# Patient Record
Sex: FEMALE | Race: OTHER | HISPANIC OR LATINO
[De-identification: names, ages, dates, MRNs, and addresses within clinical notes are randomized per-mention and may not be internally consistent; named-entity substitution may affect disease eponyms.]

---

## 2020-03-02 ENCOUNTER — TRANSCRIPTION ENCOUNTER (OUTPATIENT)
Age: 25
End: 2020-03-02

## 2020-11-22 ENCOUNTER — FORM ENCOUNTER (OUTPATIENT)
Age: 25
End: 2020-11-22

## 2020-12-29 ENCOUNTER — FORM ENCOUNTER (OUTPATIENT)
Age: 25
End: 2020-12-29

## 2021-02-22 ENCOUNTER — TRANSCRIPTION ENCOUNTER (OUTPATIENT)
Age: 26
End: 2021-02-22

## 2021-02-27 ENCOUNTER — TRANSCRIPTION ENCOUNTER (OUTPATIENT)
Age: 26
End: 2021-02-27

## 2021-03-20 ENCOUNTER — TRANSCRIPTION ENCOUNTER (OUTPATIENT)
Age: 26
End: 2021-03-20

## 2021-06-16 ENCOUNTER — TRANSCRIPTION ENCOUNTER (OUTPATIENT)
Age: 26
End: 2021-06-16

## 2021-09-21 ENCOUNTER — TRANSCRIPTION ENCOUNTER (OUTPATIENT)
Age: 26
End: 2021-09-21

## 2021-11-24 ENCOUNTER — TRANSCRIPTION ENCOUNTER (OUTPATIENT)
Age: 26
End: 2021-11-24

## 2021-12-09 ENCOUNTER — TRANSCRIPTION ENCOUNTER (OUTPATIENT)
Age: 26
End: 2021-12-09

## 2022-01-13 ENCOUNTER — TRANSCRIPTION ENCOUNTER (OUTPATIENT)
Age: 27
End: 2022-01-13

## 2022-05-01 ENCOUNTER — NON-APPOINTMENT (OUTPATIENT)
Age: 27
End: 2022-05-01

## 2022-07-12 ENCOUNTER — NON-APPOINTMENT (OUTPATIENT)
Age: 27
End: 2022-07-12

## 2022-08-16 ENCOUNTER — APPOINTMENT (OUTPATIENT)
Dept: OBGYN | Facility: CLINIC | Age: 27
End: 2022-08-16

## 2022-08-16 VITALS
BODY MASS INDEX: 20.38 KG/M2 | OXYGEN SATURATION: 99 % | SYSTOLIC BLOOD PRESSURE: 120 MMHG | WEIGHT: 115 LBS | DIASTOLIC BLOOD PRESSURE: 72 MMHG | RESPIRATION RATE: 12 BRPM | HEART RATE: 51 BPM | HEIGHT: 63 IN

## 2022-08-16 DIAGNOSIS — Z72.89 OTHER PROBLEMS RELATED TO LIFESTYLE: ICD-10-CM

## 2022-08-16 DIAGNOSIS — Z78.9 OTHER SPECIFIED HEALTH STATUS: ICD-10-CM

## 2022-08-16 DIAGNOSIS — Z82.49 FAMILY HISTORY OF ISCHEMIC HEART DISEASE AND OTHER DISEASES OF THE CIRCULATORY SYSTEM: ICD-10-CM

## 2022-08-16 DIAGNOSIS — N39.0 URINARY TRACT INFECTION, SITE NOT SPECIFIED: ICD-10-CM

## 2022-08-16 PROBLEM — Z00.00 ENCOUNTER FOR PREVENTIVE HEALTH EXAMINATION: Status: ACTIVE | Noted: 2022-08-16

## 2022-08-16 LAB
BILIRUB UR QL STRIP: NORMAL
CLARITY UR: CLEAR
GLUCOSE UR-MCNC: NORMAL
HCG UR QL: 0.2 EU/DL
HCG UR QL: NEGATIVE
HGB UR QL STRIP.AUTO: NORMAL
KETONES UR-MCNC: NORMAL
LEUKOCYTE ESTERASE UR QL STRIP: NORMAL
NITRITE UR QL STRIP: NORMAL
PH UR STRIP: 7
PROT UR STRIP-MCNC: NORMAL
SP GR UR STRIP: 1.01

## 2022-08-16 PROCEDURE — 99213 OFFICE O/P EST LOW 20 MIN: CPT | Mod: 25

## 2022-08-16 PROCEDURE — 81025 URINE PREGNANCY TEST: CPT

## 2022-08-16 PROCEDURE — 81002 URINALYSIS NONAUTO W/O SCOPE: CPT

## 2022-08-16 NOTE — PLAN
[FreeTextEntry1] : Recurrent cystitis discussed and all questions were answered.\par Antibiotics sent to pharmacy.\par Post coital antibiotics recommended and discussed.

## 2022-08-16 NOTE — HISTORY OF PRESENT ILLNESS
[Y] : Patient uses contraception [N] : Patient denies prior pregnancies [Regular Cycle Intervals] : periods have been regular [Menarche Age: ____] : age at menarche was [unfilled] [No] : Patient does not have concerns regarding sex [Currently Active] : currently active [Men] : men [Yes] : Yes [Condoms] : Condoms [TextBox_4] : UTI [TextBox_19] : n/a [TextBox_25] : n/a [PapSmeardate] : 01/19 [TextBox_37] : n/a [TextBox_43] : n/a [TextBox_78] : no HX [LMPDate] : 08/10/22 [MensesFreq] : 30 [MensesLength] : 5 [PGHxTotal] : 0 [TextBox_6] : 08/10/22 [TextBox_9] : 13 [FreeTextEntry1] : 08/10/22

## 2022-08-16 NOTE — PHYSICAL EXAM
[Appropriately responsive] : appropriately responsive [Alert] : alert [No Acute Distress] : no acute distress [No Lymphadenopathy] : no lymphadenopathy [Regular Rate Rhythm] : regular rate rhythm [No Murmurs] : no murmurs [Clear to Auscultation B/L] : clear to auscultation bilaterally [Soft] : soft [Non-tender] : non-tender [Non-distended] : non-distended [No HSM] : No HSM [No Lesions] : no lesions [No Mass] : no mass [Oriented x3] : oriented x3 [Labia Majora] : normal [Labia Minora] : normal [Normal] : normal [Uterine Adnexae] : normal [FreeTextEntry8] : moderate suprpubic tenderness

## 2023-05-02 ENCOUNTER — APPOINTMENT (OUTPATIENT)
Dept: OBGYN | Facility: CLINIC | Age: 28
End: 2023-05-02
Payer: COMMERCIAL

## 2023-05-02 VITALS
RESPIRATION RATE: 16 BRPM | HEIGHT: 63 IN | OXYGEN SATURATION: 98 % | WEIGHT: 120 LBS | BODY MASS INDEX: 21.26 KG/M2 | DIASTOLIC BLOOD PRESSURE: 72 MMHG | HEART RATE: 69 BPM | SYSTOLIC BLOOD PRESSURE: 118 MMHG

## 2023-05-02 DIAGNOSIS — N92.0 EXCESSIVE AND FREQUENT MENSTRUATION WITH REGULAR CYCLE: ICD-10-CM

## 2023-05-02 DIAGNOSIS — Z12.39 ENCOUNTER FOR OTHER SCREENING FOR MALIGNANT NEOPLASM OF BREAST: ICD-10-CM

## 2023-05-02 DIAGNOSIS — Z13.31 ENCOUNTER FOR SCREENING FOR DEPRESSION: ICD-10-CM

## 2023-05-02 DIAGNOSIS — N92.6 IRREGULAR MENSTRUATION, UNSPECIFIED: ICD-10-CM

## 2023-05-02 DIAGNOSIS — Z12.4 ENCOUNTER FOR SCREENING FOR MALIGNANT NEOPLASM OF CERVIX: ICD-10-CM

## 2023-05-02 DIAGNOSIS — Z01.419 ENCOUNTER FOR GYNECOLOGICAL EXAMINATION (GENERAL) (ROUTINE) W/OUT ABNORMAL FINDINGS: ICD-10-CM

## 2023-05-02 LAB
BILIRUB UR QL STRIP: NORMAL
CLARITY UR: CLEAR
COLLECTION METHOD: NORMAL
GLUCOSE UR-MCNC: NORMAL
HCG UR QL: 0.2 EU/DL
HCG UR QL: NEGATIVE
HGB UR QL STRIP.AUTO: NORMAL
KETONES UR-MCNC: NORMAL
LEUKOCYTE ESTERASE UR QL STRIP: NORMAL
NITRITE UR QL STRIP: NORMAL
PH UR STRIP: 6
PROT UR STRIP-MCNC: NORMAL
QUALITY CONTROL: YES
SP GR UR STRIP: 1.02

## 2023-05-02 PROCEDURE — 76830 TRANSVAGINAL US NON-OB: CPT

## 2023-05-02 PROCEDURE — 99395 PREV VISIT EST AGE 18-39: CPT

## 2023-05-02 PROCEDURE — 81025 URINE PREGNANCY TEST: CPT

## 2023-05-02 PROCEDURE — ZZZZZ: CPT

## 2023-05-02 PROCEDURE — 81003 URINALYSIS AUTO W/O SCOPE: CPT | Mod: QW,59

## 2023-05-02 PROCEDURE — 36415 COLL VENOUS BLD VENIPUNCTURE: CPT

## 2023-05-02 PROCEDURE — 76857 US EXAM PELVIC LIMITED: CPT | Mod: 59

## 2023-05-02 NOTE — HISTORY OF PRESENT ILLNESS
[Y] : Patient is sexually active [N] : Patient denies prior pregnancies [Currently Active] : currently active [PapSmeardate] : 01/22 [LMPDate] : 04/27/23 [MensesFreq] : 28 [MensesLength] : 4 [TextBox_6] : 04/27/23 [TextBox_13] : 28 [TextBox_15] : 4 [FreeTextEntry1] : 04/27/23

## 2023-05-02 NOTE — PHYSICAL EXAM
[Chaperone Present] : A chaperone was present in the examining room during all aspects of the physical examination [Appropriately responsive] : appropriately responsive [Alert] : alert [No Acute Distress] : no acute distress [No Lymphadenopathy] : no lymphadenopathy [Soft] : soft [Non-tender] : non-tender [Non-distended] : non-distended [No HSM] : No HSM [No Lesions] : no lesions [No Mass] : no mass [Oriented x3] : oriented x3 [Examination Of The Breasts] : a normal appearance [No Masses] : no breast masses were palpable [Labia Majora] : normal [Labia Minora] : normal [Normal] : normal [Uterine Adnexae] : normal [Exam Deferred] : was deferred [FreeTextEntry1] : The documentation for this encounter was entered by Olga Martínez acting as a scribe for Dr. Oliveira.

## 2023-05-02 NOTE — DISCUSSION/SUMMARY
[FreeTextEntry1] : The right ovarian cyst was discussed at length with the patient.  The need for follow-up was discussed and all questions being answered.  If the cyst remains present and appears consistent with an endometrioma then laparoscopic resection will be recommended.  The patient is well aware of these findings and the recommendation.  Blood tests were drawn prior to the patient leaving the office.

## 2023-05-02 NOTE — PROCEDURE
[Suspected Polycystic Ovaries] : suspected polycystic ovaries [Suspected Ovarian Cyst] : suspected ovarian cyst [Transvaginal Ultrasound] : transvaginal ultrasound

## 2023-05-02 NOTE — PLAN
[FreeTextEntry1] : I have spent 40 minutes of time on this encounter.  Greater than 50% of the face-to-face encounter time was spent on counseling and/or coordination of care for examination findings, differential, testing, management and planning. 10 minutes were allotted to discussing the depression screening. Yearly breast cancer screening with no current clinical or radiographic concerns.  The patient was reminded regarding future well breast and general healthcare, breast cancer risk reduction, the importance of self-examination and the need for follow up.   She was again reminded of the need to take Vit D3 2500  IU daily or to keep a Vit D level above 30, and Tumeric 1000 mg daily with Black Pepper.  Plan continued yearly imaging and breast follow up, sooner as needed.  I counseled the patient on current recommendations to reduce breast cancer risk including but not inclusive to regular exercise 20-30 minutes 3-4 times a week, low fat diet, limiting alcohol consumption, maintenance of ideal body weight, yearly imaging and self breast awareness.  Questions answered.  I encouraged in light of Covid 19, social distancing, frequent hand washing and precautions to stay healthy. The risks, benefits and alternatives to an OCP were discussed and all questions were answered.  Currently there are no contraindications to taking hormonal birthcontrol.  Instructions for usage and ASA for travel were discussed.\par Please schedule a follow-up appointment in 6 months.\par STD prevention was discussed with the patient.\par Risks, benefits, alternatives of hormonal contraceptive use were discussed with the patient including but not limited to risk of contraceptive failure, deep venous thrombosis or embolism, cardiovascular disease, sexually transmitted disease transmission without use of barrier method.\par Self breast exam instructions were discussed with the patient. Risks, benefits and alternatives of IUD contraception were discussed, including risks of infertility, abnormal bleeding, uterine perforation, infection, pregnancy, and ectopic pregnancy.  \par IUD is usually inserted during or just after a period and may be accompanied by pelvic pain and cramps.\par IUD string should be checked by the patient monthly and patient is to return after the first period following insertion for pelvic examination.\par Mirena IUD is effective for 8 years, Kyleena for up to five years, Saadia for three years and ParaGard IUD effective for 10 yrs.\par  \par \par Pelvic US was recommended for irregular menses and history of ovarian cyst. \par Blood work will be completed for CBC, iron, and thyroid testing. \par Taking nonsteroidal before menses, medication for menorrhagia, OCP and IUD was mentioned as options for irregular bleeding and menorrhagia.\par Pt is aware and all questions answered.

## 2023-05-03 ENCOUNTER — NON-APPOINTMENT (OUTPATIENT)
Age: 28
End: 2023-05-03

## 2023-05-03 LAB
BASOPHILS # BLD AUTO: 0.01 K/UL
BASOPHILS NFR BLD AUTO: 0.2 %
EOSINOPHIL # BLD AUTO: 0.08 K/UL
EOSINOPHIL NFR BLD AUTO: 1.4 %
HCT VFR BLD CALC: 38.1 %
HGB BLD-MCNC: 12.4 G/DL
IMM GRANULOCYTES NFR BLD AUTO: 0.2 %
LYMPHOCYTES # BLD AUTO: 2.43 K/UL
LYMPHOCYTES NFR BLD AUTO: 43.8 %
MAN DIFF?: NORMAL
MCHC RBC-ENTMCNC: 29.7 PG
MCHC RBC-ENTMCNC: 32.5 GM/DL
MCV RBC AUTO: 91.4 FL
MONOCYTES # BLD AUTO: 0.34 K/UL
MONOCYTES NFR BLD AUTO: 6.1 %
NEUTROPHILS # BLD AUTO: 2.68 K/UL
NEUTROPHILS NFR BLD AUTO: 48.3 %
PLATELET # BLD AUTO: 230 K/UL
RBC # BLD: 4.17 M/UL
RBC # FLD: 12.4 %
T3FREE SERPL-MCNC: 2.73 PG/ML
T4 SERPL-MCNC: 6.4 UG/DL
THYROGLOB AB SERPL-ACNC: <20 IU/ML
THYROPEROXIDASE AB SERPL IA-ACNC: <10 IU/ML
TSH SERPL-ACNC: 1.16 UIU/ML
WBC # FLD AUTO: 5.55 K/UL

## 2023-05-08 LAB — CYTOLOGY CVX/VAG DOC THIN PREP: NORMAL

## 2023-06-18 ENCOUNTER — TRANSCRIPTION ENCOUNTER (OUTPATIENT)
Age: 28
End: 2023-06-18

## 2023-06-23 ENCOUNTER — APPOINTMENT (OUTPATIENT)
Dept: OBGYN | Facility: CLINIC | Age: 28
End: 2023-06-23
Payer: COMMERCIAL

## 2023-06-23 DIAGNOSIS — N83.291 OTHER OVARIAN CYST, RIGHT SIDE: ICD-10-CM

## 2023-06-23 DIAGNOSIS — E28.2 POLYCYSTIC OVARIAN SYNDROME: ICD-10-CM

## 2023-06-23 PROCEDURE — 76830 TRANSVAGINAL US NON-OB: CPT

## 2023-06-23 PROCEDURE — 76857 US EXAM PELVIC LIMITED: CPT

## 2023-06-26 PROBLEM — N83.291 OTHER OVARIAN CYST, RIGHT SIDE: Status: ACTIVE | Noted: 2023-05-02

## 2023-06-26 PROBLEM — E28.2 PCOS (POLYCYSTIC OVARIAN SYNDROME): Status: ACTIVE | Noted: 2023-05-02

## 2023-06-26 NOTE — PROCEDURE
[Suspected Polycystic Ovaries] : suspected polycystic ovaries [Suspected Ovarian Cyst] : suspected ovarian cyst [F/U Abnormal US] : f/u abnormal ultrasound [Transvaginal Ultrasound] : transvaginal ultrasound

## 2023-07-25 ENCOUNTER — APPOINTMENT (OUTPATIENT)
Dept: OBGYN | Facility: CLINIC | Age: 28
End: 2023-07-25
Payer: COMMERCIAL

## 2023-07-25 DIAGNOSIS — N83.209 UNSPECIFIED OVARIAN CYST, UNSPECIFIED SIDE: ICD-10-CM

## 2023-07-25 PROCEDURE — 76857 US EXAM PELVIC LIMITED: CPT

## 2023-07-25 PROCEDURE — 76830 TRANSVAGINAL US NON-OB: CPT

## 2023-07-25 NOTE — DISCUSSION/SUMMARY
[FreeTextEntry1] : PT pelvic US was reviewed and appreciation of ovarian cyst was discussed. There has been no resolution since previous imaging on 5/2/23.\par \par I have spent 40 minutes of time on this encounter.  Greater than 50% of the face-to-face encounter time was spent on counseling and/or coordination of care for examination findings, differential, testing, management and planning.\par \par Options for laparoscopic removal or surveillance was discussed in detail with the PT and her mother who was on the phone.  The risks, benefits and alternatives were discussed and all questions were answered.  This large adnexal cyst was discussed in detail.  The concern for the possibility of torsion of the structure possibly ovarian torsion or torsion of the fallopian tube given the sheer size of this growth.  As his growth has not resolved the recommendation for removal was made.  We discussed the alternatives which included continued observation.  All questions were answered in their entirety in this over 40-minute face-to-face discussion.\par \par Pelvic MRI was recommended to visualize location of cyst. Following MRI, the discussion of cyst removal will be revisited.  PT is aware and all questions answered.

## 2023-07-25 NOTE — PROCEDURE
[Pelvic Mass] : pelvic mass [Suspected Ovarian Cyst] : suspected ovarian cyst [F/U Abnormal US] : f/u abnormal ultrasound [Transvaginal Ultrasound] : transvaginal ultrasound

## 2023-09-12 ENCOUNTER — OUTPATIENT (OUTPATIENT)
Dept: OUTPATIENT SERVICES | Facility: HOSPITAL | Age: 28
LOS: 1 days | End: 2023-09-12
Payer: COMMERCIAL

## 2023-09-12 ENCOUNTER — APPOINTMENT (OUTPATIENT)
Dept: MRI IMAGING | Facility: CLINIC | Age: 28
End: 2023-09-12
Payer: COMMERCIAL

## 2023-09-12 DIAGNOSIS — N83.209 UNSPECIFIED OVARIAN CYST, UNSPECIFIED SIDE: ICD-10-CM

## 2023-09-12 PROCEDURE — 72197 MRI PELVIS W/O & W/DYE: CPT | Mod: 26

## 2023-09-12 PROCEDURE — 72197 MRI PELVIS W/O & W/DYE: CPT

## 2023-09-12 PROCEDURE — A9585: CPT

## 2023-09-18 ENCOUNTER — NON-APPOINTMENT (OUTPATIENT)
Age: 28
End: 2023-09-18

## 2023-11-14 ENCOUNTER — APPOINTMENT (OUTPATIENT)
Dept: HUMAN REPRODUCTION | Facility: CLINIC | Age: 28
End: 2023-11-14
Payer: COMMERCIAL

## 2023-11-14 PROCEDURE — 76830 TRANSVAGINAL US NON-OB: CPT

## 2023-11-14 PROCEDURE — 99205 OFFICE O/P NEW HI 60 MIN: CPT | Mod: 25

## 2023-11-14 PROCEDURE — 36415 COLL VENOUS BLD VENIPUNCTURE: CPT

## 2024-01-03 ENCOUNTER — NON-APPOINTMENT (OUTPATIENT)
Age: 29
End: 2024-01-03

## 2024-02-15 ENCOUNTER — APPOINTMENT (OUTPATIENT)
Dept: OBGYN | Facility: CLINIC | Age: 29
End: 2024-02-15
Payer: COMMERCIAL

## 2024-02-15 VITALS
HEIGHT: 63 IN | WEIGHT: 120 LBS | DIASTOLIC BLOOD PRESSURE: 73 MMHG | SYSTOLIC BLOOD PRESSURE: 108 MMHG | BODY MASS INDEX: 21.26 KG/M2

## 2024-02-15 PROCEDURE — 99214 OFFICE O/P EST MOD 30 MIN: CPT

## 2024-02-27 NOTE — HISTORY OF PRESENT ILLNESS
[FreeTextEntry1] : Pt is a 29 y/o G0 referred by Dr. Oliveira and Dr. Lopez as a consultation for ovarian cyst. Approx 9 months ago she started having painful periods which precipitated ultrasounds. She reports her periods are regular/monthly, with bad back pain. The pain was not present when she was previously taking OCPs. She states she saturates through multiple pads a day for total of 4 days. Denies intermenstrual or post coital bleeding. No dyspareunia. Denies h/o STDs. Though she is seeing and JAELYN she is not actively trying to get pregnant at this time. She has not tried to conceive on her own thus far. She is not currently on any hormones/contraception.  MRI 9/12/23: FINDINGS: UTERUS: Rightward pelvic deviation suggests pelvic adhesive disease. Uterus approximately measures 9 x 3.5 x 5 cm. No discrete myometrial mass ENDOMETRIUM: Maximally measures 8 mm towards fundus JUNCTIONAL ZONE: Few focal areas of thickening for example at the right lateral body near fundus which may correspond with focal adenomyosis RIGHT OVARY: Contains a 5 mm endometrioma. LEFT OVARY: Contains a 2 cm dominant follicle ADNEXA: Dilation of the distal portion of the right fallopian tube measuring up to 2.5 cm in caliber containing internal hemorrhagic signal compatible with hematosalpinx BLADDER: Within normal limits. LYMPH NODES: No pelvic lymphadenopathy. VISUALIZED PORTIONS: No hydronephrosis BOWEL: Unobstructed PERITONEUM: No ascites. VESSELS: Within normal limits. ABDOMINAL WALL: Tiny fat-containing umbilical hernia. BONES: Hematopoietic bone marrow signal  IMPRESSION: Findings suggestive for focal uterine adenomyosis Subcentimeter right ovarian endometrioma Right hematosalpinx along with findings suggestive for pelvic adhesive disease  PMH: none PSH: lsc appendectomy GYNHx: denies h/o of abnormal paps, denies h/o STDs. Report h/o ruptured ovarian cyst. OBHx: G0 All: NKDA Meds: none FamHx: denies fam h/o bleeding/clotting d/o, breast/uterine/ovarian/colon ca SH: denies toxic habits x3

## 2024-02-27 NOTE — PLAN
[FreeTextEntry1] : Discussed the issues regarding endometriosis at length. Treatment options of surgical evaluation vs empiric therapy with ocp's, depoprovera and gnrh agonist therapy were discussed.  The risks and limitations of surgical evaluation and prolonged gnrh agonist therapy were discussed in detail. With findings of rt hydrosalpinx and pain to undergo laparoscopic evaluation possible ovarian cystectomy possible rt salpingectomy possible excision of endometriosis the risks and laternatives of surgical evaluation was discussed. During this visit 40 minutes were spent face-to-face with greater than 50% of the time dedicated to counseling.

## 2024-03-06 ENCOUNTER — APPOINTMENT (OUTPATIENT)
Dept: GYNECOLOGIC ONCOLOGY | Facility: CLINIC | Age: 29
End: 2024-03-06
Payer: COMMERCIAL

## 2024-03-06 ENCOUNTER — OUTPATIENT (OUTPATIENT)
Dept: OUTPATIENT SERVICES | Facility: HOSPITAL | Age: 29
LOS: 1 days | End: 2024-03-06

## 2024-03-06 VITALS
HEART RATE: 60 BPM | RESPIRATION RATE: 20 BRPM | OXYGEN SATURATION: 99 % | SYSTOLIC BLOOD PRESSURE: 120 MMHG | WEIGHT: 119.05 LBS | TEMPERATURE: 98 F | DIASTOLIC BLOOD PRESSURE: 76 MMHG | HEIGHT: 64 IN

## 2024-03-06 VITALS
OXYGEN SATURATION: 98 % | HEIGHT: 62 IN | SYSTOLIC BLOOD PRESSURE: 115 MMHG | WEIGHT: 122 LBS | DIASTOLIC BLOOD PRESSURE: 78 MMHG | RESPIRATION RATE: 16 BRPM | HEART RATE: 61 BPM | BODY MASS INDEX: 22.45 KG/M2

## 2024-03-06 DIAGNOSIS — R10.2 PELVIC AND PERINEAL PAIN: ICD-10-CM

## 2024-03-06 DIAGNOSIS — N80.9 ENDOMETRIOSIS, UNSPECIFIED: ICD-10-CM

## 2024-03-06 DIAGNOSIS — Z90.49 ACQUIRED ABSENCE OF OTHER SPECIFIED PARTS OF DIGESTIVE TRACT: Chronic | ICD-10-CM

## 2024-03-06 DIAGNOSIS — N80.129 DEEP ENDOMETRIOSIS OF OVARY, UNSPECIFIED OVARY: ICD-10-CM

## 2024-03-06 DIAGNOSIS — N92.6 IRREGULAR MENSTRUATION, UNSPECIFIED: ICD-10-CM

## 2024-03-06 DIAGNOSIS — N83.6 HEMATOSALPINX: ICD-10-CM

## 2024-03-06 LAB
BLD GP AB SCN SERPL QL: NEGATIVE — SIGNIFICANT CHANGE UP
HCG UR QL: NEGATIVE — SIGNIFICANT CHANGE UP
HCT VFR BLD CALC: 37.4 % — SIGNIFICANT CHANGE UP (ref 34.5–45)
HGB BLD-MCNC: 12.6 G/DL — SIGNIFICANT CHANGE UP (ref 11.5–15.5)
MCHC RBC-ENTMCNC: 29.6 PG — SIGNIFICANT CHANGE UP (ref 27–34)
MCHC RBC-ENTMCNC: 33.7 GM/DL — SIGNIFICANT CHANGE UP (ref 32–36)
MCV RBC AUTO: 87.8 FL — SIGNIFICANT CHANGE UP (ref 80–100)
NRBC # BLD: 0 /100 WBCS — SIGNIFICANT CHANGE UP (ref 0–0)
NRBC # FLD: 0 K/UL — SIGNIFICANT CHANGE UP (ref 0–0)
PLATELET # BLD AUTO: 229 K/UL — SIGNIFICANT CHANGE UP (ref 150–400)
RBC # BLD: 4.26 M/UL — SIGNIFICANT CHANGE UP (ref 3.8–5.2)
RBC # FLD: 12.8 % — SIGNIFICANT CHANGE UP (ref 10.3–14.5)
RH IG SCN BLD-IMP: POSITIVE — SIGNIFICANT CHANGE UP
RH IG SCN BLD-IMP: POSITIVE — SIGNIFICANT CHANGE UP
WBC # BLD: 3.6 K/UL — LOW (ref 3.8–10.5)
WBC # FLD AUTO: 3.6 K/UL — LOW (ref 3.8–10.5)

## 2024-03-06 PROCEDURE — 99204 OFFICE O/P NEW MOD 45 MIN: CPT

## 2024-03-06 PROCEDURE — 99214 OFFICE O/P EST MOD 30 MIN: CPT

## 2024-03-06 PROCEDURE — 99459 PELVIC EXAMINATION: CPT

## 2024-03-06 RX ORDER — NITROFURANTOIN MACROCRYSTALS 50 MG/1
50 CAPSULE ORAL
Qty: 30 | Refills: 1 | Status: DISCONTINUED | COMMUNITY
Start: 2022-08-16 | End: 2024-03-06

## 2024-03-06 RX ORDER — PHENAZOPYRIDINE 200 MG/1
200 TABLET, FILM COATED ORAL 3 TIMES DAILY
Qty: 6 | Refills: 1 | Status: DISCONTINUED | COMMUNITY
Start: 2022-08-16 | End: 2024-03-06

## 2024-03-06 RX ORDER — NITROFURANTOIN (MONOHYDRATE/MACROCRYSTALS) 25; 75 MG/1; MG/1
100 CAPSULE ORAL
Qty: 10 | Refills: 0 | Status: DISCONTINUED | COMMUNITY
Start: 2022-08-16 | End: 2024-03-06

## 2024-03-06 RX ORDER — SODIUM CHLORIDE 9 MG/ML
1000 INJECTION, SOLUTION INTRAVENOUS
Refills: 0 | Status: DISCONTINUED | OUTPATIENT
Start: 2024-03-11 | End: 2024-03-25

## 2024-03-06 NOTE — H&P PST ADULT - NSANTHOSAYNRD_GEN_A_CORE
No. HEYDI screening performed.  STOP BANG Legend: 0-2 = LOW Risk; 3-4 = INTERMEDIATE Risk; 5-8 = HIGH Risk

## 2024-03-06 NOTE — H&P PST ADULT - PROBLEM SELECTOR PLAN 1
Scheduled for Operative Laparoscopy, Possible Excision of Endometriosis Possible Right Salpingectomy Possible Ovarian Cystectomy on 03/11/24.  Preop instructions provided and patient verbalizes understanding.  Labs done and results pending.  Famotidine provided with instructions. Hibiclens provided with instructions and was signed by patient. Teach-back method was utilized to assess patient's understanding. Patient verbalized understanding.

## 2024-03-06 NOTE — H&P PST ADULT - HISTORY OF PRESENT ILLNESS
28 yr old female with significant medical hx presents for preop evaluation with painful and heavy periods started approximately 6 months ago.  Patient was evaluated by GYN s/p sonograms, TUVS and MRI and was dx with  28 yr old female with significant medical hx presents for preop evaluation with painful (back pains) and heavy periods (saturating about 4-5 pads daily) started approximately 6 months ago.  Patient was evaluated by GYN s/p sonograms, TUVS and MRI and was dx with endometriosis.  Patient is now scheduled for Operative Laparoscopy, Possibly Excision of Endometriosis Possible Right Salpingectomy Possible Ovarian Cystectomy tentatively n 03/11/24.

## 2024-03-07 PROBLEM — N83.6 HEMATOSALPINX: Status: ACTIVE | Noted: 2024-03-07

## 2024-03-08 NOTE — END OF VISIT
[FreeTextEntry3] : I, Dr. Daisy Garcia, personally performed the evaluation and management of (E/M) services for this new patient. That E/M includes conducting the initial examination, assessing all conditions, and establishing the plan of care. Today, Kim Kay PA-C, was here to observe my evaluation and management services for this patient to be followed going forward.

## 2024-03-08 NOTE — HISTORY OF PRESENT ILLNESS
[FreeTextEntry1] : 29 yo nulligravid female LMP 3/2/24 referred by family member for surgical management of endometriosis. Patient reports she was originally placed on OCP in 2017 for contraception, since being off OCP in 2020 (Trilenea) her menses are painful described as tightening and pulling from her pelvis to her back. She reports regular menses, which last about 4 days with 2-3 heavy days. She takes tylenol and advil for two days while menstruating and states she probably wouldnt be able to work if she didnt take these medications. She reports left lower pain when her bladder is full and during voiding. She denies dyspareunia, pain with bowel movements.   US 7/25/23 with anteverted uterus, complex cyst in right adnexa measuring 5.3 x 2.1 x 3.5 cm, likely paraovarian cyst, endometrioma of right fallopian tube.   MR Pelvis 9/12/23 uterus with rightward pelvic deviation suggests pelvic adhesive disease. Uterus approximately measures 9 x 3.5 x 5 cm. No discrete myometrial mass. Endometrium maximally measures 8 mm towards fundus. Few focal areas of thickening for examples at the right lateral body near fundus which may correspond with focal adenomyosis. Dilation of the distal portion of the right fallopian tube measuring up to 2.5 cm in caliber containing internal hemorrhagic signal compatible with hematosalpinx, along with findings suggestive for pelvic adhesive disease. Findings suggestive for focal uterine adenomyosis. Subcm R ovarian endometrioma.   Patient is engaged to be  in summer of 2025 and will be trying to start a family within the next 5 years.   Patient saw Dr Romo and was recommended to have diagnostic laparoscopy possible ovarian cystectomy, possible right salpingectomy and possible excision of endometriosis.   Lpap: 5/2023 WNL

## 2024-03-08 NOTE — PLAN
[TextEntry] : Patient to decide if she would like to start Orilissa or Seasonique If Orilissa, may need DEXA to document baseline bone health prior to starting She may continue with plan for diagnostic laparoscopy with Dr Romo, she will find out if chromopertubation can be added to procedure Would recommend against RS and would prefer patient try hormonal management to decrease inflammation prior to

## 2024-03-08 NOTE — PHYSICAL EXAM
[Chaperone Present] : A chaperone was present in the examining room during all aspects of the physical examination [Abnormal] : Cervix: Abnormal [Normal] : Parametria: Normal [FreeTextEntry1] : Kim Kay PA-C [de-identified] : Tenderness

## 2024-03-08 NOTE — DISCUSSION/SUMMARY
[FreeTextEntry1] : Long discussion with patient and her mother about the pathophysiology of endometriosis. We discussed in previous generations endometriosis was not as commonly encountered as women were frequently pregnant and therefore did not menstruate nearly as much as young women do now. We discussed retrograde flow of menses with endometrial tissue which then implants in other parts of the abdominal cavity, this tissue reacts the same as endometrial tissue when menstruation occurs which can cause pain, inflammation and scarring. We discussed pain management should include starting anti-inflammatory medications a few days before the expected start of her menses which may aid in her pain relief. In addition we discussed at this time her pain is not debilitating however, the aspect of endometriosis which may impact her quality of life is the scarring and inflammation. We discussed her future plans for fertility and the effects the inflammatory process has already had on her reproductive organs-mainly the visible hematosalpinx on imaging. I advised her it is possible her right fallopian tube also has scarring which could decrease her changes of conceiving naturally when she plans to. Options of management for hematosalpinx were discussed, surgical intervention is my least favored option at this time as I would like to preserve all aspects of her reproductive tract. Instead, I recommend medical management with Orilissa or continuous OCP. Orilissa is preferred as she could be on the low dose for two years and the higher dose for 6 months. I advised her due to her lack of symptoms I would start her on the low dose and not have menstrual cycles for the entire time she is on the medication, thus decreasing her inflammation and scarring which could in turn improve her hematosalpinx. We discussed side effects of Orilissa use as it mimics post menopausal state and she may develop hot flashes, vaginal dryness, decreased sex drive, low bone density, although less likely with the low dose. Patient was fearful of these symptoms and therefore questioned OCP as an option. Continuous OCP with Seasonique was discussed however this would decrease her menstrual periods to 4 times a year instead of 12 but not completely negate them, she may still develop inflammation at those times. I also recommended her to have an JAELYN consultation as she could potentially undergo a chrompertubation to determine if her fallopian tubes are patent and therefore avoid surgical removal. In addition, she could explore freeze embryos and undergo the process while in her 20's and have them for use when she is ready to start a family, as well as beginning the process of being under their care should she have infertility issues in the future due to her endometriosis. Patient reports she is scheduled for surgery with Dr Romo on Monday 3/11 for diagnostic laparoscopy, I advised patient this may be beneficial and she could potentially as Dr Romo to add chromopertuabtion to her procedure. Patient will think over her options and reach out to my office should she want to be started on medical management.

## 2024-03-08 NOTE — CHIEF COMPLAINT
[Other: _____] : [unfilled] [FreeTextEntry1] : [facility] [place] Office [address] [place] [address]

## 2024-03-10 ENCOUNTER — TRANSCRIPTION ENCOUNTER (OUTPATIENT)
Age: 29
End: 2024-03-10

## 2024-03-11 ENCOUNTER — TRANSCRIPTION ENCOUNTER (OUTPATIENT)
Age: 29
End: 2024-03-11

## 2024-03-11 ENCOUNTER — RESULT REVIEW (OUTPATIENT)
Age: 29
End: 2024-03-11

## 2024-03-11 ENCOUNTER — OUTPATIENT (OUTPATIENT)
Dept: OUTPATIENT SERVICES | Facility: HOSPITAL | Age: 29
LOS: 1 days | Discharge: HOME | End: 2024-03-11
Payer: COMMERCIAL

## 2024-03-11 VITALS
RESPIRATION RATE: 15 BRPM | OXYGEN SATURATION: 98 % | DIASTOLIC BLOOD PRESSURE: 74 MMHG | HEART RATE: 82 BPM | SYSTOLIC BLOOD PRESSURE: 111 MMHG | TEMPERATURE: 98 F | WEIGHT: 119.05 LBS | HEIGHT: 64 IN

## 2024-03-11 VITALS
SYSTOLIC BLOOD PRESSURE: 109 MMHG | OXYGEN SATURATION: 98 % | DIASTOLIC BLOOD PRESSURE: 68 MMHG | RESPIRATION RATE: 16 BRPM | TEMPERATURE: 98 F | HEART RATE: 86 BPM

## 2024-03-11 DIAGNOSIS — Z90.49 ACQUIRED ABSENCE OF OTHER SPECIFIED PARTS OF DIGESTIVE TRACT: Chronic | ICD-10-CM

## 2024-03-11 DIAGNOSIS — N92.6 IRREGULAR MENSTRUATION, UNSPECIFIED: ICD-10-CM

## 2024-03-11 LAB — HCG UR QL: NEGATIVE — SIGNIFICANT CHANGE UP

## 2024-03-11 PROCEDURE — 58662 LAPAROSCOPY EXCISE LESIONS: CPT

## 2024-03-11 PROCEDURE — 88305 TISSUE EXAM BY PATHOLOGIST: CPT | Mod: 26

## 2024-03-11 PROCEDURE — 58661 LAPAROSCOPY REMOVE ADNEXA: CPT

## 2024-03-11 PROCEDURE — 88302 TISSUE EXAM BY PATHOLOGIST: CPT | Mod: 26

## 2024-03-11 PROCEDURE — 88342 IMHCHEM/IMCYTCHM 1ST ANTB: CPT | Mod: 26

## 2024-03-11 DEVICE — INTERCEED 5 X 6" XL: Type: IMPLANTABLE DEVICE | Status: FUNCTIONAL

## 2024-03-11 RX ORDER — HYDROMORPHONE HYDROCHLORIDE 2 MG/ML
0.5 INJECTION INTRAMUSCULAR; INTRAVENOUS; SUBCUTANEOUS
Refills: 0 | Status: DISCONTINUED | OUTPATIENT
Start: 2024-03-11 | End: 2024-03-11

## 2024-03-11 RX ORDER — HYDROMORPHONE HYDROCHLORIDE 2 MG/ML
1 INJECTION INTRAMUSCULAR; INTRAVENOUS; SUBCUTANEOUS
Refills: 0 | Status: DISCONTINUED | OUTPATIENT
Start: 2024-03-11 | End: 2024-03-11

## 2024-03-11 RX ORDER — IBUPROFEN 200 MG
1 TABLET ORAL
Qty: 0 | Refills: 0 | DISCHARGE

## 2024-03-11 RX ORDER — IBUPROFEN 200 MG
2 TABLET ORAL
Refills: 0 | DISCHARGE

## 2024-03-11 RX ORDER — ACETAMINOPHEN 500 MG
2 TABLET ORAL
Refills: 0 | DISCHARGE

## 2024-03-11 RX ORDER — ONDANSETRON 8 MG/1
4 TABLET, FILM COATED ORAL ONCE
Refills: 0 | Status: DISCONTINUED | OUTPATIENT
Start: 2024-03-11 | End: 2024-03-25

## 2024-03-11 RX ORDER — ACETAMINOPHEN 500 MG
3 TABLET ORAL
Qty: 0 | Refills: 0 | DISCHARGE

## 2024-03-11 RX ORDER — OXYCODONE HYDROCHLORIDE 5 MG/1
1 TABLET ORAL
Qty: 8 | Refills: 0
Start: 2024-03-11

## 2024-03-11 RX ADMIN — HYDROMORPHONE HYDROCHLORIDE 0.5 MILLIGRAM(S): 2 INJECTION INTRAMUSCULAR; INTRAVENOUS; SUBCUTANEOUS at 18:58

## 2024-03-11 NOTE — BRIEF OPERATIVE NOTE - NSICDXBRIEFPROCEDURE_GEN_ALL_CORE_FT
PROCEDURES:  Right salpingectomy 11-Mar-2024 18:09:40  Jossy Torres  Chromotubation, laparoscopic 11-Mar-2024 18:10:17  Jossy Torres  Excision, endometriosis, laparoscopic 11-Mar-2024 18:10:53  Jossy Torres  Pelvic examination under anesthesia 11-Mar-2024 18:18:36  Jossy Torres

## 2024-03-11 NOTE — ASU DISCHARGE PLAN (ADULT/PEDIATRIC) - CARE PROVIDER_API CALL
David Romo  Obstetrics and Gynecology  [address] [place] [address], [address]  [place] [address]  Phone: [phone]  Fax: [phone]  Follow Up Time: 2 weeks

## 2024-03-11 NOTE — BRIEF OPERATIVE NOTE - NSICDXBRIEFPOSTOP_GEN_ALL_CORE_FT
POST-OP DIAGNOSIS:  Endometriosis 11-Mar-2024 18:11:47  Jossy Torres  Hematosalpinx 11-Mar-2024 18:11:54  Jossy Torres

## 2024-03-11 NOTE — BRIEF OPERATIVE NOTE - OPERATION/FINDINGS
EUA: 6cm anteverted uterus, no adnexal masses palpated  Laparoscopy: normal uterus, left fallopian tube and ovary. Absent right ovary, IP ligament and right utero-ovarian ligament. 5cm x 2cm x 2cm right hematosalpinx. Bilateral ureters seen vermiculating. Normal bowel, liver edge, stomach. Surgically absent appendix. Gunpowder endometriotic implants along cul de sac and right uterosacral ligament. White endometriotic lesions along left pelvic side wall.

## 2024-03-11 NOTE — ASU DISCHARGE PLAN (ADULT/PEDIATRIC) - ASU DC SPECIAL INSTRUCTIONSFT
Alternate Tylenol 975mg q6 hrs and Motrin 600mg q6hrs so that you are taking pain medication every 3 hrs. Take oxycodone for breakthrough pain.  Vaginal spotting for the next couple of days is normal.  If heavy vaginal bleeding, foul smelling discharge, fevers, or pain not controlled with oral pain meds, please contact your provider or go to the emergency room.  Nothing in the vagina for the next two weeks. Please call Dr. Romo's office for a 2 week follow-up.

## 2024-03-11 NOTE — BRIEF OPERATIVE NOTE - NSICDXBRIEFPREOP_GEN_ALL_CORE_FT
PRE-OP DIAGNOSIS:  Right ovarian cyst 11-Mar-2024 18:11:20  Jossy Torres  Endometriosis 11-Mar-2024 18:11:03  Jossy Torres  Hematosalpinx 11-Mar-2024 18:11:43  Jossy Torres

## 2024-03-11 NOTE — BRIEF OPERATIVE NOTE - COMMENTS
Dictation per Dr. Esquivel    Interceed placed over left pelvic side wall. Dictation#[id number]    Interceed placed over left pelvic side wall.

## 2024-03-11 NOTE — CHART NOTE - NSCHARTNOTEFT_GEN_A_CORE
R2 OBGYN POST-OP CHECK    S: Patient seen and evaluated at bedside.  Pt sleeping, easily arousable.  Patient reports pain controlled with analgesia.  Tolerating clears.  Voiding spontaneously, ambulating.  Patient denies N/V, SOB, CP, palpitations, fever/chills.     O:   T(C): 37 (03-11-24 @ 17:40), Max: 37 (03-11-24 @ 17:40)  HR: 84 (03-11-24 @ 19:00) (81 - 96)  BP: 112/69 (03-11-24 @ 19:00) (110/60 - 127/62)  RR: 16 (03-11-24 @ 19:00) (12 - 17)  SpO2: 99% (03-11-24 @ 19:00) (99% - 100%)  Wt(kg): --  I&O's Summary    11 Mar 2024 07:01  -  11 Mar 2024 19:50  --------------------------------------------------------  IN: 270 mL / OUT: 300 mL / NET: -30 mL        Gen: Resting comfortably in bed, NAD  CV: S1S2, RRR  Lungs: CTA B/L  Abd: soft, appropriately tender, occasional BS x 4 quadrants.    Inc: 4 lsc port sites clean/dry/intact covered with steri strips  Ext: non-tender b/l, no edema        A/P: 28y Female s/p LSC excision of endometriosis, RS.  Patient stable and progressing appropriately postoperatively.      Neuro: PO Analgesia PRN   CV: Hemodynamically stable.  Monitor VS.  Pulm: Saturating well on room air.  Encourage OOB and incentive spirometer use.   GI: Advance to regular diet. Anti-emetics PRN.  : Voiding spontaneously  FEN: Electrolytes: LR@125cc/hr.  Heme: DVT ppx w/ SCD's while in bed. Early ambulation, initially with assistance then as tolerated.  ID: Afebrile  Endo: No active issues   Dispo: Discharge from PACU when criteria met.     Discussed with Dr. Esquivel, MIGS Fellow and Dr. Demetrius Macario, PGY2

## 2024-03-14 ENCOUNTER — APPOINTMENT (OUTPATIENT)
Dept: OBGYN | Facility: HOSPITAL | Age: 29
End: 2024-03-14

## 2024-03-22 LAB — SURGICAL PATHOLOGY STUDY: SIGNIFICANT CHANGE UP

## 2024-03-28 ENCOUNTER — APPOINTMENT (OUTPATIENT)
Dept: OBGYN | Facility: CLINIC | Age: 29
End: 2024-03-28
Payer: COMMERCIAL

## 2024-03-28 VITALS
DIASTOLIC BLOOD PRESSURE: 67 MMHG | BODY MASS INDEX: 20.38 KG/M2 | WEIGHT: 115 LBS | SYSTOLIC BLOOD PRESSURE: 110 MMHG | HEIGHT: 63 IN

## 2024-03-28 PROBLEM — N92.6 IRREGULAR MENSTRUATION, UNSPECIFIED: Chronic | Status: ACTIVE | Noted: 2024-03-06

## 2024-03-28 PROBLEM — N80.9 ENDOMETRIOSIS, UNSPECIFIED: Chronic | Status: ACTIVE | Noted: 2024-03-06

## 2024-03-28 PROBLEM — N83.209 UNSPECIFIED OVARIAN CYST, UNSPECIFIED SIDE: Chronic | Status: ACTIVE | Noted: 2024-03-06

## 2024-03-28 PROCEDURE — 99024 POSTOP FOLLOW-UP VISIT: CPT

## 2024-04-02 NOTE — HISTORY OF PRESENT ILLNESS
[Pain is well-controlled] : pain is well-controlled [Fever] : no fever [Chills] : no chills [Nausea] : no nausea [Vomiting] : no vomiting [Clean/Dry/Intact] : clean, dry and intact [Erythema] : not erythematous [None] : no vaginal bleeding [Normal] : normal [Pathology reviewed] : pathology reviewed [de-identified] : doing well s/p endometriosis excision

## 2024-04-25 ENCOUNTER — APPOINTMENT (OUTPATIENT)
Dept: OBGYN | Facility: CLINIC | Age: 29
End: 2024-04-25
Payer: COMMERCIAL

## 2024-04-25 VITALS
SYSTOLIC BLOOD PRESSURE: 103 MMHG | WEIGHT: 115 LBS | BODY MASS INDEX: 20.38 KG/M2 | DIASTOLIC BLOOD PRESSURE: 63 MMHG | HEIGHT: 63 IN

## 2024-04-25 PROCEDURE — 99024 POSTOP FOLLOW-UP VISIT: CPT

## 2024-04-29 RX ORDER — LEVONORGESTREL AND ETHINYL ESTRADIOL 100-20(84)
0.1-0.02 & 0.01 KIT ORAL DAILY
Qty: 1 | Refills: 3 | Status: ACTIVE | COMMUNITY
Start: 2024-04-29 | End: 1900-01-01

## 2024-04-29 NOTE — HISTORY OF PRESENT ILLNESS
[Pain is well-controlled] : pain is well-controlled [Fever] : no fever [Chills] : no chills [Nausea] : no nausea [Vomiting] : no vomiting [Clean/Dry/Intact] : clean, dry and intact [Erythema] : not erythematous [None] : no vaginal bleeding [Normal] : normal [Pathology reviewed] : pathology reviewed [de-identified] : pt doing well but reports some continued pain s/p endometriosis excision  desires tx

## 2024-04-29 NOTE — PLAN
[FreeTextEntry1] : Normal postop discussed hormonal options and risks to start continuous ocp with lo seasonique

## 2024-05-01 ENCOUNTER — APPOINTMENT (OUTPATIENT)
Dept: INTERNAL MEDICINE | Facility: CLINIC | Age: 29
End: 2024-05-01
Payer: COMMERCIAL

## 2024-05-01 VITALS
HEART RATE: 66 BPM | WEIGHT: 115 LBS | RESPIRATION RATE: 15 BRPM | OXYGEN SATURATION: 95 % | SYSTOLIC BLOOD PRESSURE: 114 MMHG | DIASTOLIC BLOOD PRESSURE: 70 MMHG | HEIGHT: 63 IN | BODY MASS INDEX: 20.38 KG/M2 | TEMPERATURE: 97.3 F

## 2024-05-01 DIAGNOSIS — Z00.00 ENCOUNTER FOR GENERAL ADULT MEDICAL EXAMINATION W/OUT ABNORMAL FINDINGS: ICD-10-CM

## 2024-05-01 DIAGNOSIS — Z23 ENCOUNTER FOR IMMUNIZATION: ICD-10-CM

## 2024-05-01 PROCEDURE — 99205 OFFICE O/P NEW HI 60 MIN: CPT

## 2024-05-01 NOTE — HISTORY OF PRESENT ILLNESS
[de-identified] : 27 y/o F w/ PMH of endometriosis coming in to establish care. Patient was seeing a primary care doctor back in 2022 in [place] but coming to [place] is easier for her now.   Patient had recent surgery for endometriosis, removal of right fallopian tube. Has been doing well post-surgically. No longer having pelvic pain.   Patient also noticed a bump on neck. Worried about a tick bite since she went walking outside in [place] two days ago. Does not recall of any tick bite or insect bite.   ROS: Denies symptoms of headaches, lightheadedness, dizziness, abdominal pain, CP, SOB, muscle/joint pain, fatigue.

## 2024-05-01 NOTE — HEALTH RISK ASSESSMENT
[0] : 2) Feeling down, depressed, or hopeless: Not at all (0) [PHQ-2 Negative - No further assessment needed] : PHQ-2 Negative - No further assessment needed [Hepatitis C test offered] : Hepatitis C test offered [Fully functional (bathing, dressing, toileting, transferring, walking, feeding)] : Fully functional (bathing, dressing, toileting, transferring, walking, feeding) [Fully functional (using the telephone, shopping, preparing meals, housekeeping, doing laundry, using] : Fully functional and needs no help or supervision to perform IADLs (using the telephone, shopping, preparing meals, housekeeping, doing laundry, using transportation, managing medications and managing finances) [Very Good] : ~his/her~  mood as very good [Yes] : Yes [Monthly or less (1 pt)] : Monthly or less (1 point) [1 or 2 (0 pts)] : 1 or 2 (0 points) [Never (0 pts)] : Never (0 points) [No] : In the past 12 months have you used drugs other than those required for medical reasons? No [No falls in past year] : Patient reported no falls in the past year [Patient reported PAP Smear was normal] : Patient reported PAP Smear was normal [With Family] : lives with family [Employed] : employed [Significant Other] : lives with significant other [Sexually Active] : sexually active [Feels Safe at Home] : Feels safe at home [Smoke Detector] : smoke detector [Never] : Never [Audit-CScore] : 1 [[id number]] : 0 [High Risk Behavior] : no high risk behavior [Sunscreen] : does not use sunscreen [PapSmearComments] : Last pap smear May 2023,  [FreeTextEntry3] : Engaged [TextEntry] : Meds: No current meds.  Allergies: No allergies.  Hospitalizations: Appendicitis, 2021 Surgical Hx: Appendectomy, 2021  Family Hx: HTN in both parents, otherwise denies the following chronic conditions: DM, Heart disease (CAD, HF), Asthma, Liver disease, Bleeding disorders, Genetic conditions, Cancer Hx: Denies family hx of the following cancers: Colon, Breast, Uterine, Ovarian, Liver, Lung, Bladder, Prostate, Social Hx: Smoking: Never Alcohol: Socially, couple times a month  Recreational Drugs: None  Sexual Hx: Currently sexually active, male, one current, monogamous relationship, engaged, no prior sti  Occupation/Education: Speech therapist, school  Living Situation: Lives with mom  Marital Status: Engaged  Children/Family: No kids Immunizations/Vaccines: Covid: 2 total  Flu: Received this past season: Shingles: N/A Pneumovax: N/A  Tdap: Documented in chart received in 2020.

## 2024-05-01 NOTE — PHYSICAL EXAM
[No JVD] : no jugular venous distention [No Lymphadenopathy] : no lymphadenopathy [Supple] : supple [Thyroid Normal, No Nodules] : the thyroid was normal and there were no nodules present [No Edema] : there was no peripheral edema [Coordination Grossly Intact] : coordination grossly intact [No Focal Deficits] : no focal deficits [Normal Gait] : normal gait [Alert and Oriented x3] : oriented to person, place, and time [Normal] : affect was normal and insight and judgment were intact [de-identified] : One single papule on upper back, appears to be excoriation lesion.

## 2024-05-01 NOTE — PLAN
[FreeTextEntry1] : #Annual Physical Healthy 27 y/o -Lab work: CMP, A1c, CBC, Vit D, Lipid panel, Hep B and Hep C Screening -Patient declining STI screening -Follows with gyn for pap smear and is UTD -Went over symptoms of Lyme disease, which the patient does not exhibit currently   Follow Up: Next Annual or as needed

## 2024-05-08 ENCOUNTER — APPOINTMENT (OUTPATIENT)
Dept: FAMILY MEDICINE | Facility: CLINIC | Age: 29
End: 2024-05-08

## 2024-11-23 LAB
25(OH)D3 SERPL-MCNC: 23.4 NG/ML
ALBUMIN SERPL ELPH-MCNC: 4.5 G/DL
ALP BLD-CCNC: 53 U/L
ALT SERPL-CCNC: 10 U/L
ANION GAP SERPL CALC-SCNC: 13 MMOL/L
AST SERPL-CCNC: 16 U/L
BASOPHILS # BLD AUTO: 0.02 K/UL
BASOPHILS NFR BLD AUTO: 0.5 %
BILIRUB SERPL-MCNC: 0.4 MG/DL
BUN SERPL-MCNC: 14 MG/DL
CALCIUM SERPL-MCNC: 9.5 MG/DL
CHLORIDE SERPL-SCNC: 104 MMOL/L
CHOLEST SERPL-MCNC: 244 MG/DL
CO2 SERPL-SCNC: 22 MMOL/L
CREAT SERPL-MCNC: 0.75 MG/DL
EGFR: 111 ML/MIN/1.73M2
EOSINOPHIL # BLD AUTO: 0.03 K/UL
EOSINOPHIL NFR BLD AUTO: 0.7 %
ESTIMATED AVERAGE GLUCOSE: 103 MG/DL
GLUCOSE SERPL-MCNC: 86 MG/DL
HBA1C MFR BLD HPLC: 5.2 %
HBV CORE IGG+IGM SER QL: NONREACTIVE
HBV SURFACE AB SER QL: REACTIVE
HBV SURFACE AG SER QL: NONREACTIVE
HCT VFR BLD CALC: 42 %
HDLC SERPL-MCNC: 60 MG/DL
HGB BLD-MCNC: 13.5 G/DL
IMM GRANULOCYTES NFR BLD AUTO: 0.2 %
LDLC SERPL CALC-MCNC: 174 MG/DL
LYMPHOCYTES # BLD AUTO: 1.82 K/UL
LYMPHOCYTES NFR BLD AUTO: 43.5 %
MAN DIFF?: NORMAL
MCHC RBC-ENTMCNC: 29.2 PG
MCHC RBC-ENTMCNC: 32.1 G/DL
MCV RBC AUTO: 90.7 FL
MONOCYTES # BLD AUTO: 0.28 K/UL
MONOCYTES NFR BLD AUTO: 6.7 %
NEUTROPHILS # BLD AUTO: 2.02 K/UL
NEUTROPHILS NFR BLD AUTO: 48.4 %
NONHDLC SERPL-MCNC: 184 MG/DL
PLATELET # BLD AUTO: 279 K/UL
POTASSIUM SERPL-SCNC: 4.2 MMOL/L
PROT SERPL-MCNC: 7.2 G/DL
RBC # BLD: 4.63 M/UL
RBC # FLD: 12.7 %
SODIUM SERPL-SCNC: 140 MMOL/L
TRIGL SERPL-MCNC: 64 MG/DL
WBC # FLD AUTO: 4.18 K/UL

## 2024-11-27 DIAGNOSIS — E78.00 PURE HYPERCHOLESTEROLEMIA, UNSPECIFIED: ICD-10-CM

## 2024-12-02 ENCOUNTER — APPOINTMENT (OUTPATIENT)
Dept: CARDIOLOGY | Facility: CLINIC | Age: 29
End: 2024-12-02

## 2024-12-25 PROBLEM — F10.90 ALCOHOL USE: Status: ACTIVE | Noted: 2022-08-16

## 2025-01-17 ENCOUNTER — NON-APPOINTMENT (OUTPATIENT)
Age: 30
End: 2025-01-17

## 2025-03-18 ENCOUNTER — APPOINTMENT (OUTPATIENT)
Dept: OBGYN | Facility: CLINIC | Age: 30
End: 2025-03-18
Payer: COMMERCIAL

## 2025-03-18 VITALS
HEIGHT: 63 IN | RESPIRATION RATE: 14 BRPM | WEIGHT: 117 LBS | BODY MASS INDEX: 20.73 KG/M2 | DIASTOLIC BLOOD PRESSURE: 68 MMHG | OXYGEN SATURATION: 98 % | SYSTOLIC BLOOD PRESSURE: 112 MMHG | HEART RATE: 89 BPM

## 2025-03-18 DIAGNOSIS — Z12.39 ENCOUNTER FOR OTHER SCREENING FOR MALIGNANT NEOPLASM OF BREAST: ICD-10-CM

## 2025-03-18 DIAGNOSIS — R10.2 PELVIC AND PERINEAL PAIN: ICD-10-CM

## 2025-03-18 DIAGNOSIS — Z13.31 ENCOUNTER FOR SCREENING FOR DEPRESSION: ICD-10-CM

## 2025-03-18 DIAGNOSIS — R35.0 FREQUENCY OF MICTURITION: ICD-10-CM

## 2025-03-18 DIAGNOSIS — Z30.41 ENCOUNTER FOR SURVEILLANCE OF CONTRACEPTIVE PILLS: ICD-10-CM

## 2025-03-18 DIAGNOSIS — Z01.419 ENCOUNTER FOR GYNECOLOGICAL EXAMINATION (GENERAL) (ROUTINE) W/OUT ABNORMAL FINDINGS: ICD-10-CM

## 2025-03-18 DIAGNOSIS — Z12.4 ENCOUNTER FOR SCREENING FOR MALIGNANT NEOPLASM OF CERVIX: ICD-10-CM

## 2025-03-18 DIAGNOSIS — N80.9 ENDOMETRIOSIS, UNSPECIFIED: ICD-10-CM

## 2025-03-18 LAB
HCG UR QL: NEGATIVE
QUALITY CONTROL: YES

## 2025-03-18 PROCEDURE — 76830 TRANSVAGINAL US NON-OB: CPT

## 2025-03-18 PROCEDURE — 81025 URINE PREGNANCY TEST: CPT

## 2025-03-18 PROCEDURE — 99459 PELVIC EXAMINATION: CPT

## 2025-03-18 PROCEDURE — 99395 PREV VISIT EST AGE 18-39: CPT

## 2025-03-18 PROCEDURE — ZZZZZ: CPT

## 2025-03-18 PROCEDURE — 76857 US EXAM PELVIC LIMITED: CPT

## 2025-03-23 LAB — CYTOLOGY CVX/VAG DOC THIN PREP: NORMAL

## (undated) DEVICE — BLADE SURGICAL #15 CARBON

## (undated) DEVICE — BASIN SET SINGLE

## (undated) DEVICE — TUBING HYDRO-SURG PLUS IRRIGATOR W SMOKEVAC & PROBE

## (undated) DEVICE — LABELS BLANK W PEN

## (undated) DEVICE — POSITIONER STRAP ARMBOARD VELCRO TS-30

## (undated) DEVICE — TIP METZENBAUM SCISSOR MONOPOLAR ENDOCUT (ORANGE)

## (undated) DEVICE — SUT VLOC 180 0 12" GS-21 GREEN

## (undated) DEVICE — FOLEY CATH 2-WAY 16FR 5CC LATEX LUBRICATH

## (undated) DEVICE — POSITIONER FOAM OR TABLE PAD CONVOLUTED (PINK)

## (undated) DEVICE — GLV 7 PROTEXIS (CREAM) MICRO

## (undated) DEVICE — SCOPE WARMER SEAL DISP

## (undated) DEVICE — SOL IRR POUR H2O 500ML

## (undated) DEVICE — TUBING OLYMPUS INSUFFLATION

## (undated) DEVICE — DRSG KERLIX MED 6"

## (undated) DEVICE — PROTECTOR HEEL / ELBOW FLUFFY

## (undated) DEVICE — ENDOCATCH 10MM SPECIMEN POUCH

## (undated) DEVICE — PACK GENERAL LAPAROSCOPY

## (undated) DEVICE — SYR ASEPTO

## (undated) DEVICE — TUBING TRUWAVE PRESSURE MALE/FEMALE 12"

## (undated) DEVICE — VENODYNE/SCD SLEEVE CALF MEDIUM

## (undated) DEVICE — TRAP SPECIMEN SPUTUM 40CC

## (undated) DEVICE — TUBING STRYKEFLOW II SUCTION / IRRIGATOR

## (undated) DEVICE — SUT MONOCRYL 4-0 27" PS-2 UNDYED

## (undated) DEVICE — UTERINE MANIPULATOR CONMED VCARE MED 34MM

## (undated) DEVICE — PREP BETADINE SPONGE STICKS

## (undated) DEVICE — FOLEY TRAY 16FR 5CC LF UMETER CLOSED

## (undated) DEVICE — GLV 6.5 PROTEXIS (CREAM) MICRO

## (undated) DEVICE — INSUFFLATION NDL COVIDIEN SURGINEEDLE VERESS 120MM

## (undated) DEVICE — OLYMPUS PK SPATULA 5MM

## (undated) DEVICE — TROCAR COVIDIEN VERSASTEP PLUS 12MM STANDARD

## (undated) DEVICE — ELCTR GROUNDING PAD ADULT COVIDIEN

## (undated) DEVICE — POSITIONER PINK PAD PIGAZZI SYSTEM W ARM PROTECTOR

## (undated) DEVICE — SOL IRR POUR NS 0.9% 500ML

## (undated) DEVICE — DRSG TEGADERM 1.75X1.75"

## (undated) DEVICE — GOWN XL

## (undated) DEVICE — LIGASURE BLUNT TIP 37CM

## (undated) DEVICE — TROCAR COVIDIEN VERSAONE BLADELESS FIXATION 5MM STANDARD

## (undated) DEVICE — WARMING BLANKET FULL ADULT

## (undated) DEVICE — PACK D&C

## (undated) DEVICE — SUT VICRYL 0 27" UR-6

## (undated) DEVICE — SYR LUER LOK 50CC